# Patient Record
Sex: FEMALE | Race: WHITE
[De-identification: names, ages, dates, MRNs, and addresses within clinical notes are randomized per-mention and may not be internally consistent; named-entity substitution may affect disease eponyms.]

---

## 2020-06-28 ENCOUNTER — HOSPITAL ENCOUNTER (EMERGENCY)
Dept: HOSPITAL 62 - ER | Age: 65
Discharge: HOME | End: 2020-06-28
Payer: MEDICARE

## 2020-06-28 VITALS — SYSTOLIC BLOOD PRESSURE: 158 MMHG | DIASTOLIC BLOOD PRESSURE: 92 MMHG
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
## 2020-06-28 DIAGNOSIS — R11.2: ICD-10-CM

## 2020-06-28 DIAGNOSIS — F10.10: ICD-10-CM

## 2020-06-28 DIAGNOSIS — S00.83XA: ICD-10-CM

## 2020-06-28 DIAGNOSIS — S09.90XA: Primary | ICD-10-CM

## 2020-06-28 DIAGNOSIS — I10: ICD-10-CM

## 2020-06-28 DIAGNOSIS — R51: ICD-10-CM

## 2020-06-28 DIAGNOSIS — H40.9: ICD-10-CM

## 2020-06-28 LAB
ADD MANUAL DIFF: NO
ALBUMIN SERPL-MCNC: 4.3 G/DL (ref 3.5–5)
ALP SERPL-CCNC: 50 U/L (ref 38–126)
ANION GAP SERPL CALC-SCNC: 8 MMOL/L (ref 5–19)
AST SERPL-CCNC: 32 U/L (ref 14–36)
BASOPHILS # BLD AUTO: 0.1 10^3/UL (ref 0–0.2)
BASOPHILS NFR BLD AUTO: 0.5 % (ref 0–2)
BILIRUB DIRECT SERPL-MCNC: 0 MG/DL (ref 0–0.4)
BILIRUB SERPL-MCNC: 0.3 MG/DL (ref 0.2–1.3)
BUN SERPL-MCNC: 10 MG/DL (ref 7–20)
CALCIUM: 8.3 MG/DL (ref 8.4–10.2)
CHLORIDE SERPL-SCNC: 107 MMOL/L (ref 98–107)
CO2 SERPL-SCNC: 24 MMOL/L (ref 22–30)
EOSINOPHIL # BLD AUTO: 0 10^3/UL (ref 0–0.6)
EOSINOPHIL NFR BLD AUTO: 0 % (ref 0–6)
ERYTHROCYTE [DISTWIDTH] IN BLOOD BY AUTOMATED COUNT: 12.8 % (ref 11.5–14)
GLUCOSE SERPL-MCNC: 111 MG/DL (ref 75–110)
HCT VFR BLD CALC: 37.5 % (ref 36–47)
HGB BLD-MCNC: 12.9 G/DL (ref 12–15.5)
LYMPHOCYTES # BLD AUTO: 0.8 10^3/UL (ref 0.5–4.7)
LYMPHOCYTES NFR BLD AUTO: 8.8 % (ref 13–45)
MCH RBC QN AUTO: 32.1 PG (ref 27–33.4)
MCHC RBC AUTO-ENTMCNC: 34.5 G/DL (ref 32–36)
MCV RBC AUTO: 93 FL (ref 80–97)
MONOCYTES # BLD AUTO: 0.3 10^3/UL (ref 0.1–1.4)
MONOCYTES NFR BLD AUTO: 3 % (ref 3–13)
NEUTROPHILS # BLD AUTO: 8.5 10^3/UL (ref 1.7–8.2)
NEUTS SEG NFR BLD AUTO: 87.7 % (ref 42–78)
PLATELET # BLD: 201 10^3/UL (ref 150–450)
POTASSIUM SERPL-SCNC: 4 MMOL/L (ref 3.6–5)
PROT SERPL-MCNC: 6.6 G/DL (ref 6.3–8.2)
RBC # BLD AUTO: 4.04 10^6/UL (ref 3.72–5.28)
TOTAL CELLS COUNTED % (AUTO): 100 %
WBC # BLD AUTO: 9.7 10^3/UL (ref 4–10.5)

## 2020-06-28 PROCEDURE — 85025 COMPLETE CBC W/AUTO DIFF WBC: CPT

## 2020-06-28 PROCEDURE — 70450 CT HEAD/BRAIN W/O DYE: CPT

## 2020-06-28 PROCEDURE — 36415 COLL VENOUS BLD VENIPUNCTURE: CPT

## 2020-06-28 PROCEDURE — 96361 HYDRATE IV INFUSION ADD-ON: CPT

## 2020-06-28 PROCEDURE — 72125 CT NECK SPINE W/O DYE: CPT

## 2020-06-28 PROCEDURE — 80053 COMPREHEN METABOLIC PANEL: CPT

## 2020-06-28 PROCEDURE — 96374 THER/PROPH/DIAG INJ IV PUSH: CPT

## 2020-06-28 PROCEDURE — 90471 IMMUNIZATION ADMIN: CPT

## 2020-06-28 PROCEDURE — 99284 EMERGENCY DEPT VISIT MOD MDM: CPT

## 2020-06-28 PROCEDURE — 90715 TDAP VACCINE 7 YRS/> IM: CPT

## 2020-06-28 NOTE — RADIOLOGY REPORT (SQ)
EXAM DESCRIPTION: 



CT CERVICAL SPINE WITHOUT IV CONTRAST, CT HEAD WITHOUT IV

CONTRAST



COMPLETED DATE/TME:  06/28/2020 00:00



CLINICAL HISTORY: 



64 years, Female, fall with head injury



This exam was performed according to our departmental

dose-optimization program which includes automated exposure

control, adjustment of the mA and/or kVp according to patient

size and/or use of iterative reconstruction technique where

applicable.





FINDINGS: No acute intracranial hemorrhage, mass effect or

midline shift. No extra-axial fluid collections. Ventricles and

subarachnoid spaces are preserved. Gray-white matter

differentiation is preserved. Visualized paranasal sinuses and

the mastoid air cells are clear. The skull is intact.



Vertebral body heights are intact. Alignment is intact. Moderate

degenerative changes at C5-C6. No significant prevertebral soft

tissue swelling. Facets are normally aligned. Odontoid process is

intact.



IMPRESSION:



No acute intracranial hemorrhage. No cervical spine fracture or

subluxation. Moderate degenerative changes.

## 2020-06-28 NOTE — ER DOCUMENT REPORT
ED Fall





- General


Chief Complaint: Fall


Stated Complaint: FALL


Time Seen by Provider: 06/28/20 01:57


Primary Care Provider: 


[facility] [Provider Group] - Follow up as needed


[facility] [Provider Group] - Follow up as needed


[facility] [Provider Group] - Follow up as needed


Mode of Arrival: Medic


Information source: Patient


Notes: 





64-year-old female presented to ED for pain to her face.  She states she was 

outside when she got up to go inside and tripped over the dog falling face 

forward onto the concrete.  She states she did not lose consciousness.  States 

her only medical history is glaucoma and high blood pressure but she has not 

been seen for her blood pressure.  She states she does have arthritis and had a 

right inguinal hernia repair when she was a child.  She is alert oriented 

respirations regular nonlabored at this time.  She was brought in by EMS.  She 

states that when she first fell she was okay for a little while and then she got

nauseated and vomited for 5 times.  She states she does not smoke drinks 3 to 4 

days a week and has had at least 4 or 5 beers a night.


TRAVEL OUTSIDE OF THE [place] IN LAST 30 DAYS: No





- HPI


Occurred: This evening


Where: Home, Outdoors


Context: Tripped - Tripped over the dog


Associated symptoms: None


Location of injury/pain: Face


Quality of pain: Other - Tenderness to the face


Severity: Mild


Pain Level: 1





- Related data


Allergies/Adverse Reactions: 


                                        





No Known Allergies Allergy (Unverified 06/28/20 01:54)


   











Past Medical History





- General


Information source: Patient





- Social History


Smoking Status: Never Smoker


Cigarette use (# per day): No


Smoking Education Provided: No


Frequency of alcohol use: Heavy - 3 to 4 days a week 4-5 beers a night


Drug Abuse: None


Lives with: Family


Family History: Reviewed & Not Pertinent


Patient has suicidal ideation: No


Patient has homicidal ideation: No





- Past Medical History


Cardiac Medical History: Reports: Hx Hypertension


Pulmonary Medical History: Reports: None


EENT Medical History: Reports: Eyes - Glaucoma


Neurological Medical History: Reports: None


Endocrine Medical History: Reports: None


Renal/ Medical History: Reports: None


Malignancy Medical History: Reports: None


GI Medical History: Reports: None


Musculoskeletal Medical History: Reports Hx Arthritis


Skin Medical History: Reports None


Psychiatric Medical History: Reports: None


Traumatic Medical History: Reports: None


Infectious Medical History: Reports: None


Past Surgical History: Reports: Hx Inguinal Hernia





- Immunizations


Immunizations up to date: Yes


Hx Diphtheria, Pertussis, Tetanus Vaccination: Yes - 6/28/2020





Review of Systems





- Review of Systems


Constitutional: No symptoms reported


EENT: Other - Bruising to the left side of her face


Cardiovascular: No symptoms reported


Respiratory: No symptoms reported


Gastrointestinal: No symptoms reported


Genitourinary: No symptoms reported


Female Genitourinary: No symptoms reported


Musculoskeletal: No symptoms reported


Skin: Other - Tenderness and bruising to the left side of her face


Hematologic/Lymphatic: No symptoms reported


Neurological/Psychological: No symptoms reported


-: Yes All other systems reviewed and negative





Physical Exam





- Vital signs


Vitals: 


                                        











Temp


 


 98.6 F 


 


 06/28/20 01:33











Interpretation: Normal





- General


General appearance: Appears well, Alert





- HEENT


Head: Abrasions - Left side of her face, Ecchymosis, Tenderness


Eyes: Normal


Pupils: PERRL


Ears: Normal


External canal: Normal


Tympanic membrane: Normal


Sinus: Normal


Nasal: Normal


Mouth/Lips: Normal


Mucous membranes: Normal


Pharynx: Normal


Neck: Normal





- Respiratory


Respiratory status: No respiratory distress


Chest status: Nontender


Breath sounds: Normal


Chest palpation: Normal





- Cardiovascular


Rhythm: Regular


Heart sounds: Normal auscultation


Murmur: No





- Abdominal


Inspection: Normal


Distension: No distension


Bowel sounds: Normal


Tenderness: Nontender


Organomegaly: No organomegaly





- Back


Back: Normal, Nontender





- Extremities


General upper extremity: Normal inspection, Nontender, Normal color, Normal ROM,

Normal temperature


General lower extremity: Normal inspection, Nontender, Normal color, Normal ROM,

Normal temperature, Normal weight bearing.  No: Vy's sign





- Neurological


Neuro grossly intact: Yes


Cognition: Normal


Orientation: AAOx4


Scot Coma Scale Eye Opening: Spontaneous


[place] Coma Scale Verbal: Oriented


[place]ot Coma Scale Motor: Obeys Commands


[place]ot Coma Scale Total: 15


Speech: Normal


Motor strength normal: LUE, RUE, LLE, RLE


Sensory: Normal





- Psychological


Associated symptoms: Normal affect, Normal mood





- Skin


Skin Temperature: Warm


Skin Moisture: Dry


Skin Color: Normal, Ecchymosis


Location of irregularity: Face - Tenderness bruising and abrasion to the left 

side of her face


Irregularity with: Tenderness





Course





- Re-evaluation


Re-evalutation: 





06/28/20 09:10


CT of the cervical spine showed degenerative changes CT of the head was 

negative.  Patient was given 2 L of fluid due to her elevated pulse throughout 

her visit.  She had 5 beers before coming into the emergency room and had 

vomited numerous times before coming in.  She was alert and oriented taking 

fluids and crackers well before discharge.  She was discharged home in stable 

condition and left with her family.





- Vital Signs


Vital signs: 


                                        











Temp Pulse Resp BP Pulse Ox


 


 98.6 F   107 H  21 H  158/92 H  100 


 


 06/28/20 01:44  06/28/20 04:57  06/28/20 04:01  06/28/20 04:00  06/28/20 04:01














- Laboratory


Result Diagrams: 


                                 06/28/20 03:38





                                 06/28/20 03:38


Laboratory results interpreted by me: 


                                        











  06/28/20 06/28/20





  03:38 03:38


 


Lymph % (Auto)  8.8 L 


 


Absolute Neuts (auto)  8.5 H 


 


Seg Neutrophils %  87.7 H 


 


Glucose   111 H


 


Calcium   8.3 L














- Diagnostic Test


Radiology reviewed: Image reviewed, Reports reviewed





Discharge





- Discharge


Clinical Impression: 


Fall


Qualifiers:


 Encounter type: initial encounter Qualified Code(s): W19.XXXA - Unspecified 

fall, initial encounter





Head injury


Qualifiers:


 Encounter type: initial encounter Qualified Code(s): S09.90XA - Unspecified 

injury of head, initial encounter





Facial contusion


Qualifiers:


 Encounter type: initial encounter Qualified Code(s): S00.83XA - Contusion of 

other part of head, initial encounter





Nausea & vomiting


Qualifiers:


 Vomiting type: unspecified Vomiting Intractability: non-intractable Qualified 

Code(s): R11.2 - Nausea with vomiting, unspecified





Condition: Stable


Disposition: HOME, SELF-CARE


Additional Instructions: 


HEAD INJURY PRECAUTIONS:


     At this point, there is no evidence that your head injury is serious.  

Observation is necessary, however.


     Take only clear liquids for the first few hours, unless told otherwise by 

the doctor.  If no pain medication was prescribed, you may take acetaminophen 

according to the directions on the bottle.  Do not take any medication that may 

alter your level of alertness (unless you've discussed it with the doctor 

first).


      Limit activity for the first 24 hours.  Bed rest is best.  During the 

first 24 hours, check to see approximately every two to three hours that the 

patient is easily arousable, responds normally, and can perform common tasks 

such as walking without difficulty.


      Contact your doctor or go to the hospital if any of the following things 

occur: Persistent vomiting, difficulty in arousing the patient, worsening or 

continued headache, or failure to improve as expected.  Head injuries can cause 

symptoms that persist for a few days or even a few weeks.





Nausea or Vomiting, Nonspecific





     Vomiting (or nausea without vomiting) can be caused by many different 

problems. Of course, it can mean that something's wrong with the stomach, such 

as "stomach flu," ulcers, or inflammation. But it can also be a symptom of a 

problem that has nothing to do with the stomach or intestines. Vomiting is 

common with severe headaches, earaches, and tonsillitis. We see it with 

pneumonia or heart attacks. Drugs can cause nausea. Many abdominal problems 

cause vomiting; for example, gallstones, kidney stones, pancreatitis, and 

intestinal obstruction (blocked bowels).


     In most cases, curing the vomiting depends on fixing the problem that 

caused it. For temporary relief, we may use an anti-nausea medicine. For home 

use, we can prescribe suppositories, chewable pills, pills that dissolve in the 

mouth, or liquid anti-nausea drugs. If the vomiting seems to be caused by a 

problem in the stomach, acid-suppressing drugs may be prescribed as well.


     It's important to avoid dehydration. Sip clear liquids. Take increasing 

amounts of fluid over the first 24 hours. Then start small amounts of bland 

foods (such as dry toast, applesauce, mashed potato). Avoid aspirin, tobacco, 

and alcohol. Gradually resume your usual diet.


     If the vomiting worsens, if the problem that's making you vomit worsens, or

if there's evidence of bleeding in the stomach (such as black, tarry stool, 

bloody or black vomit, or lightheadedness), you should return immediately. Call 

your doctor if you aren't improved in 24 to 36 hours.








NECK INJURY (CERVICAL STRAIN):


     You have a neck strain.  This is an injury to the muscles and ligaments in 

the neck.  There is no evidence of a fracture of the neck bones.  Also, no 

injury to the spinal cord or nerve roots was detected.


     Usually, stiffness and pain INCREASE for the first 24-48 hours after the 

injury.  The pain will gradually resolve and the neck will become more mobile.  

Most patients are back at work or school within a few days.  Typically, complete

healing takes about two or three weeks.


     The usual initial treatment is rest and cold packs.  A neck collar may be 

placed to keep the muscles of the neck at rest. Antiinflammatory and muscle 

relaxing medication are often used to reduce the spasm and irritation.


     You should call the doctor, or go to the hospital, if you develop numbness 

or weakness in any extremity, problems with your bladder or bowel, or pain 

radiating down the arms.





CONTUSION:


    Your injury has resulted in a contusion -- a crushing of the deep tissues.  

No injury to important structures was detected during the physician's exam.  

Contusions vary in the amount of pain they cause, and in the length of time 

required for healing.  Typically, the area will become bruised, and will remain 

painful to touch for two or three weeks.  However, most patients are back to 

working and playing within a few days.


     After the initial period of rest and cold-packs, your symptoms (together 

with the doctor's recommendations) will determine how rapidly you can get back 

to full activity.  Usually this means "do what feels okay, but don't do things 

that hurt."


     If re-examination was recommended, it's important to follow up as 

instructed.  Call the doctor or return any time if pain increases, if swelling 

becomes severe, if you develop numbness or weakness in an injured extremity, or 

if any other alarming symptoms occur.





USE OF TYLENOL (ACETAMINOPHEN):


     Acetaminophen may be taken for pain relief or fever control. It's much 

safer than aspirin, offering a wider range of "safe" dosages.  It is safe during

pregnancy.  Some brand names are Tylenol, Panadol, Datril, Anacin 3, Tempra, and

Liquiprin. Acetaminophen can be repeated every four hours.  The following are 

maximum recommended dosages:





WEIGHT         Dose             Drops                  Elixir        

Chewable(80mg)


(LBS.)                            drprs=droppers    tsp=teaspoon


6               40 mg            0.4 ml (1/2)


6-11            80 mg            0.8 ml (full)              tsp                

 1       tab


12-16         120 mg           1 1/2 drprs             3/4  tsp               1 

1/2  tabs


17-23         160 mg             2  drprs             1    tsp                  

2       tabs


24-30         240 mg             3  drprs             1 1/2 tsp                3

      tabs


30-35         320 mg                                       2    tsp             

     4       tabs


36-41         360 mg                                       2 1/4   tsp          

   4 1/2 tabs


42-47         400 mg                                       2 1/2   tsp          

   5      tabs


48-53         480 mg                                       3    tsp             

     6      tabs


54-59         520 mg                                       3  1/4  tsp          

   6 1/2 tabs


60-64         560 mg                                       3  1/2  tsp          

   7      tabs 


65-70         600 mg                                       3  3/4  tsp          

   7 1/2 tabs


71-76         640 mg                                       4   tsp              

    8      tabs


77-82         720 mg                                       4 1/2   tsp          

  9      tabs


83-88         800 mg                                       5   tsp              

  10      tabs





>89 pounds or adults          650 mg to 900 mg





Acetaminophen can be repeated every four hours.  Maximum dose not to exceed 4000

mg a day.





   These maximum recommended dosages are slightly higher than the dosages 

written on the product container, but these dosages are very safe and below the 

toxic dosage for acetaminophen.





TETANUS IMMUNIZATION GIVEN:


     You have been given an immunization against tetanus.  Please record this in

your records.  In general, a booster is needed only once every 10 years.  The 

tetanus shot protects against tetanus or "lockjaw," which is a complication of 

certain wound infections (the tetanus shot cannot protect against the actual 

infection).


     The immunization site may become warm and red due to local reaction.  If 

this occurs, apply warm compresses and take aspirin or ibuprofen to reduce 

inflammation and discomfort.  Return for evaluation if the reaction becomes 

severe.





Intravenous (IV) Fluids





     As part of your care today, you received intravenous (IV) fluids.  IV 

fluids are administered to patients who are dehydrated or to those who have 

certain chemical (electrolyte) abnormalities that need correcting.





ICE PACKS:


     Apply ice packs frequently against the painful area.  Many different sc

hedules are recommended, such as "20 minutes on, 20 minutes off" or "one hour 

ice, two hours rest."  If you need to work, you may need to go longer between 

ice treatments.  You should plan to have the area ice packed AT LEAST one fourth

of the time.


     The ice should be applied over the wrap, tape, or splint, or over a layer 

of cloth -- not directly against the skin.  Some ice bags have a built-in cloth 

and can be put directly on the skin.





WARM PACKS:


     After approximately two days, apply gentle heat (such as a heating pad or 

hot water bottle) for about 20 to 30 minutes about every two hours -- at least 

four times daily.  Warmth and elevation will help you make a more rapid 

recovery, and will ease the pain considerably.


     Do not use HOT heat, and never apply heat for longer than 30 minutes.  The 

continuous heat can invisibly damage skin and muscles -- even when no burn is 

seen on the surface.  Damaged muscles can make you MORE sore.





Antinausea Medication





     You have been given a medication to suppress nausea and vomiting. This type

of medication can be given as a shot, pill, or suppository. It will usually last

for many hours.  Pills and shots usually last six to eight hours, suppositories 

last about 12 hours.  For the typical illness, only one or two doses of the 

medication may be necessary.


     Mild lightheadedness may occur.  This type of medicine can cause 

drowsiness.  Do not drive or operate dangerous machinery while under its influ

ence.  Do not mix with alcohol.


     See your doctor at once if you have muscle spasms or tightness, or 

uncontrollable motions (particularly of the neck, mouth, or jaw). Persistent 

vomiting or severe lightheadedness should also be evaluated by the physician.





FOLLOW-UP CARE:


If you have been referred to a physician for follow-up care, call the 

physicians office for an appointment as you were instructed or within the next 

two days.  If you experience worsening or a significant change in your symptoms,

notify the physician immediately or return to the Emergency Department at any 

time for re-evaluation.


Prescriptions: 


Ondansetron [Zofran Odt 4 mg Tablet] 1 tab PO Q6H #10 tab.rapdis


Forms:  Elevated Blood Pressure


Referrals: 


[facility] [Provider Group] - Follow up as needed


[facility] [Provider Group] - Follow up as needed


[facility] [Provider Group] - Follow up as needed

## 2023-08-11 ENCOUNTER — NEW PATIENT (OUTPATIENT)
Facility: LOCATION | Age: 68
End: 2023-08-11

## 2023-08-11 DIAGNOSIS — H25.13: ICD-10-CM

## 2023-08-11 PROCEDURE — 99199ADVT ADVANCED VISION TESTING

## 2023-08-11 PROCEDURE — 92136 OPHTHALMIC BIOMETRY: CPT

## 2023-08-11 PROCEDURE — 99204 OFFICE O/P NEW MOD 45 MIN: CPT

## 2023-08-11 ASSESSMENT — VISUAL ACUITY
OU_SC: J1+
OU_SC: 20/40-1
OS_SC: J1+
OD_SC: 20/50+2
OS_GLARE: 20/50
OD_BCVA: 20/30-1
OS_SC: 20/80-1
OS_BCVA: 20/30
OD_GLARE: 20/50

## 2023-08-11 ASSESSMENT — TONOMETRY
OS_IOP_MMHG: 16
OD_IOP_MMHG: 17

## 2023-08-11 ASSESSMENT — KERATOMETRY
OS_AXISANGLE2_DEGREES: 147
OD_K1POWER_DIOPTERS: 43.75
OD_AXISANGLE2_DEGREES: 45
OS_K1POWER_DIOPTERS: 45.25
OS_AXISANGLE_DEGREES: 057
OD_AXISANGLE_DEGREES: 135
OS_K2POWER_DIOPTERS: 46.25
OD_K2POWER_DIOPTERS: 44.25

## 2023-08-17 ENCOUNTER — ESTABLISHED PATIENT (OUTPATIENT)
Dept: URBAN - METROPOLITAN AREA CLINIC 16 | Facility: CLINIC | Age: 68
End: 2023-08-17

## 2023-08-17 DIAGNOSIS — H25.13: ICD-10-CM

## 2023-08-17 DIAGNOSIS — H43.813: ICD-10-CM

## 2023-08-17 DIAGNOSIS — H35.373: ICD-10-CM

## 2023-08-17 PROCEDURE — 92201 OPSCPY EXTND RTA DRAW UNI/BI: CPT

## 2023-08-17 PROCEDURE — 92014 COMPRE OPH EXAM EST PT 1/>: CPT

## 2023-08-17 PROCEDURE — 92134 CPTRZ OPH DX IMG PST SGM RTA: CPT

## 2023-08-17 ASSESSMENT — VISUAL ACUITY
OD_SC: 20/25-2
OS_SC: 20/80

## 2023-08-17 ASSESSMENT — TONOMETRY
OD_IOP_MMHG: 15
OS_IOP_MMHG: 18